# Patient Record
Sex: MALE | Race: WHITE | URBAN - METROPOLITAN AREA
[De-identification: names, ages, dates, MRNs, and addresses within clinical notes are randomized per-mention and may not be internally consistent; named-entity substitution may affect disease eponyms.]

---

## 2018-12-03 ENCOUNTER — OFFICE VISIT (OUTPATIENT)
Dept: FAMILY MEDICINE CLINIC | Age: 48
End: 2018-12-03

## 2018-12-03 VITALS
HEART RATE: 85 BPM | BODY MASS INDEX: 26.1 KG/M2 | TEMPERATURE: 98 F | HEIGHT: 69 IN | WEIGHT: 176.2 LBS | SYSTOLIC BLOOD PRESSURE: 118 MMHG | OXYGEN SATURATION: 99 % | DIASTOLIC BLOOD PRESSURE: 86 MMHG

## 2018-12-03 DIAGNOSIS — Z76.89 ENCOUNTER TO ESTABLISH CARE: Primary | ICD-10-CM

## 2018-12-03 DIAGNOSIS — R00.2 HEART PALPITATIONS: ICD-10-CM

## 2018-12-03 DIAGNOSIS — Z09 HOSPITAL DISCHARGE FOLLOW-UP: ICD-10-CM

## 2018-12-03 PROCEDURE — 99203 OFFICE O/P NEW LOW 30 MIN: CPT | Performed by: FAMILY MEDICINE

## 2018-12-03 RX ORDER — IBUPROFEN 600 MG/1
600 TABLET ORAL
COMMUNITY
Start: 2013-05-02

## 2018-12-03 ASSESSMENT — ENCOUNTER SYMPTOMS
SHORTNESS OF BREATH: 0
SORE THROAT: 0
ABDOMINAL PAIN: 0
RHINORRHEA: 0
DIARRHEA: 0
NAUSEA: 0
VOMITING: 0
CONSTIPATION: 0
CHEST TIGHTNESS: 1

## 2018-12-03 NOTE — PATIENT INSTRUCTIONS
Plan for follow up with cardiology approximately 2 weeks from this past Thursday, call there to set up appointment. Continue caffeine modification  Try to limit or quit smoking    Follow up in 6 weeks for annual physical    Patient Education        Palpitations: Care Instructions  Your Care Instructions    Heart palpitations are the uncomfortable sensation that your heart is beating fast or irregularly. You might feel pounding or fluttering in your chest. It might feel like your heart is skipping a beat. Although palpitations may be caused by a heart problem, they also occur because of stress, fatigue, or use of alcohol, caffeine, or nicotine. Many medicines, including diet pills, antihistamines, decongestants, and some herbal products, can cause heart palpitations. Nearly everyone has palpitations from time to time. Depending on your symptoms, your doctor may need to do more tests to try to find the cause of your palpitations. Follow-up care is a key part of your treatment and safety. Be sure to make and go to all appointments, and call your doctor if you are having problems. It's also a good idea to know your test results and keep a list of the medicines you take. How can you care for yourself at home? · Avoid caffeine, nicotine, and excess alcohol. · Do not take illegal drugs, such as methamphetamines and cocaine. · Do not take weight loss or diet medicines unless you talk with your doctor first.  · Get plenty of sleep. · Do not overeat. · If you have palpitations again, take deep breaths and try to relax. This may slow a racing heart. · If you start to feel lightheaded, lie down to avoid injuries that might result if you pass out and fall down. · Keep a record of your palpitations and bring it to your next doctor's appointment. Write down:  ? The date and time. ? Your pulse. (If your heart is beating fast, it may be hard to count your pulse.)  ?  What you were doing when the palpitations

## 2018-12-03 NOTE — PROGRESS NOTES
left side. Bicep reflexes are 2+ on the right side and 2+ on the left side. Brachioradialis reflexes are 2+ on the right side and 2+ on the left side. Patellar reflexes are 2+ on the right side and 2+ on the left side. Skin: Skin is warm and dry. No rash noted. No erythema. Varicose vein observed/palpated on left distal medial thigh   Psychiatric: He has a normal mood and affect. His speech is normal. He expresses no homicidal and no suicidal ideation. ASSESSMENT/PLAN:  Christos Miles is a previously healthy 54 y/o male who was seen today for follow-up from hospital, established new doctor, and palpitations. Unclear etiology based on history and physical exam.  Continue  Holter monitor to assess for arrhythmia. Possible caffeine induced/iatrogenic, psychosomatic/anxiety provoked, other structural cardiac abnormality possible. Less likely concern for pulmonary embolism, with no SOB at time or now and no hypoxia. Patient also with insurance coverage issues, was supposed to be on his wife's insurance but was told that he was not on ER visit 11/29/2018 and may not have coverage until 1/1/2019. Plan for cardiology follow up in next 2 weeks and annual/preventative in 6 week January 2019.    1. Encounter to establish care  -Chart reviewed, history and physical performed    2. Hospital discharge follow-up  -Reviewed hospital documentation from ER visit 11/29/2018 at [address] [place] [address] and EKG report reviewed, CXR report reviewed; planned 2 week Holter monitor and follow up with cardiology    3. Heart palpitations  -Continue dietary modification of caffeine, okay to continue exercising, have patient see cardiology as planned  - External Referral to Mike Wright MD      Reviewed treatment plan with patient. Patient verbalized understanding to treatment plan and questions were answered.     Return in about 6 weeks (around 1/14/2019) for annual/preventatitive

## 2019-01-14 ENCOUNTER — OFFICE VISIT (OUTPATIENT)
Dept: FAMILY MEDICINE CLINIC | Age: 49
End: 2019-01-14
Payer: COMMERCIAL

## 2019-01-14 VITALS
DIASTOLIC BLOOD PRESSURE: 80 MMHG | TEMPERATURE: 98.4 F | HEIGHT: 69 IN | WEIGHT: 177.6 LBS | HEART RATE: 73 BPM | OXYGEN SATURATION: 98 % | SYSTOLIC BLOOD PRESSURE: 130 MMHG | BODY MASS INDEX: 26.31 KG/M2

## 2019-01-14 DIAGNOSIS — R00.2 PALPITATIONS: Primary | ICD-10-CM

## 2019-01-14 PROCEDURE — 99213 OFFICE O/P EST LOW 20 MIN: CPT | Performed by: FAMILY MEDICINE

## 2019-01-14 ASSESSMENT — PATIENT HEALTH QUESTIONNAIRE - PHQ9
SUM OF ALL RESPONSES TO PHQ9 QUESTIONS 1 & 2: 0
1. LITTLE INTEREST OR PLEASURE IN DOING THINGS: 0
SUM OF ALL RESPONSES TO PHQ QUESTIONS 1-9: 0
SUM OF ALL RESPONSES TO PHQ QUESTIONS 1-9: 0
2. FEELING DOWN, DEPRESSED OR HOPELESS: 0

## 2019-01-14 ASSESSMENT — ENCOUNTER SYMPTOMS
ABDOMINAL PAIN: 0
SORE THROAT: 0
WHEEZING: 0
NAUSEA: 0
CONSTIPATION: 0
CHEST TIGHTNESS: 0
VOMITING: 0
RHINORRHEA: 0
SHORTNESS OF BREATH: 0
DIARRHEA: 0